# Patient Record
Sex: MALE | Race: WHITE | NOT HISPANIC OR LATINO | Employment: UNEMPLOYED | URBAN - METROPOLITAN AREA
[De-identification: names, ages, dates, MRNs, and addresses within clinical notes are randomized per-mention and may not be internally consistent; named-entity substitution may affect disease eponyms.]

---

## 2019-01-31 ENCOUNTER — TELEPHONE (OUTPATIENT)
Dept: TRANSPLANT | Facility: CLINIC | Age: 24
End: 2019-01-31

## 2019-01-31 NOTE — TELEPHONE ENCOUNTER
----- Message from Yovanny Givens sent at 1/31/2019  1:51 PM CST -----  Contact: [facility]    Have medical recorders that where scanned into media from [facility]. Will call referring MD office if we need any additional information on the pt.    By: Yovanny Givens  ----- Message -----  From: Betzaida Monae RN  Sent: 1/31/2019   1:31 PM  To: Prachi Delacruz, [facility] Liver Referral Pool    Yes, I will forward to [facility] Liver Referral. Please send all liver and hepatology referrals to   [facility] Liver Referral    ----- Message -----  From: Prachi Delacruz  Sent: 1/31/2019   9:25 AM  To: [facility] Clinical Support    Hi Liver Trans    Dr Marty Wray is referring this pt to Liver Trans Dr Mccloud for dx NOVA    The refrl, records are in media mgr.     Can you plz review and assist with appt?     Thanks  [facility]   [id number]

## 2019-02-08 ENCOUNTER — DOCUMENTATION ONLY (OUTPATIENT)
Dept: TRANSPLANT | Facility: CLINIC | Age: 24
End: 2019-02-08

## 2019-02-08 NOTE — LETTER
February 8, 2019    Marty Wray MD  [address]  [address] [place] [address]      Dear Dr. Wray    Patient: Reji Roldan   MR Number: [id number]   YOB: 1995     Thank you for the referral of Reji Roldan to the [facility] program. An initial appointment will be scheduled for your patient with one of our Hepatologists.      Thank you again for your trust in our program.  If there is anything we can do for you or your staff, please feel free to contact us.        Sincerely,        [facility]  [address] [place] [id number]  [phone]

## 2019-02-08 NOTE — NURSING
Pt records reviewed.  Pt will be referred to Hepatology due to NOVA  Initial referral received  from Dr. TANIYA LUU  Referral letter sent to provider and patient.

## 2019-02-08 NOTE — LETTER
February 8, 2019    Reji Roldan  [address] [place] [address]      Dear Reji Roldan:    Your doctor has referred you to the [facility]. We are sending this letter to remind you to make an appointment with us to complete the referral process.     Please call us at [phone] to schedule an appointment. We look forward to seeing you soon.     If you received a call and have been scheduled, please disregard this letter.       Sincerely,        [facility]   [address] [place] [id number]  [phone]

## 2019-04-16 ENCOUNTER — OFFICE VISIT (OUTPATIENT)
Dept: HEPATOLOGY | Facility: CLINIC | Age: 24
End: 2019-04-16
Payer: COMMERCIAL

## 2019-04-16 VITALS
SYSTOLIC BLOOD PRESSURE: 132 MMHG | OXYGEN SATURATION: 99 % | HEIGHT: 70 IN | WEIGHT: 203.69 LBS | DIASTOLIC BLOOD PRESSURE: 81 MMHG | HEART RATE: 76 BPM | TEMPERATURE: 98 F | RESPIRATION RATE: 16 BRPM | BODY MASS INDEX: 29.16 KG/M2

## 2019-04-16 DIAGNOSIS — K76.0 FATTY LIVER: Primary | ICD-10-CM

## 2019-04-16 PROCEDURE — 99244 PR OFFICE CONSULTATION,LEVEL IV: ICD-10-PCS | Mod: S$GLB,,, | Performed by: INTERNAL MEDICINE

## 2019-04-16 PROCEDURE — 99999 PR PBB SHADOW E&M-EST. PATIENT-LVL III: ICD-10-PCS | Mod: PBBFAC,,, | Performed by: INTERNAL MEDICINE

## 2019-04-16 PROCEDURE — 99244 OFF/OP CNSLTJ NEW/EST MOD 40: CPT | Mod: S$GLB,,, | Performed by: INTERNAL MEDICINE

## 2019-04-16 PROCEDURE — 99999 PR PBB SHADOW E&M-EST. PATIENT-LVL III: CPT | Mod: PBBFAC,,, | Performed by: INTERNAL MEDICINE

## 2019-04-16 RX ORDER — LISINOPRIL 40 MG/1
20 TABLET ORAL DAILY
Refills: 0 | COMMUNITY
Start: 2019-04-02

## 2019-04-16 RX ORDER — LISDEXAMFETAMINE DIMESYLATE 60 MG/1
60 CAPSULE ORAL DAILY
Refills: 0 | COMMUNITY
Start: 2019-04-08

## 2019-04-16 NOTE — PATIENT INSTRUCTIONS
Recommendations:  -  Labs : CMP every 3 months, will decide at 3-6 months if need to order BONITA, IgG, A1AT, ceruloplasmin, and possibly a liver biopsy.    -  Low carb diet, avoid rice, potatoes, corn, flour-based foods, sweets, alcohol.    -  Fibroscan to be repeated in 6 months at Dr Wray's office.   -  Return PRN.

## 2019-04-16 NOTE — LETTER
April 16, 2019      Marty Wray MD  [address] [facility] [facility]  [address] [place] [id number]           [facility]at[facility]  [address]  [place] [address]  Phone: [phone]  Fax: [phone]          Patient: Reji Roldan   MR Number: [id number]   YOB: 1995   Date of Visit: 4/16/2019       Dear Dr. Marty Wray:    Thank you for referring Reji Roldan to me for evaluation. Attached you will find relevant portions of my assessment and plan of care.    If you have questions, please do not hesitate to call me. I look forward to following Reji Roldan along with you.    Sincerely,    Theresa Mccloud MD    Enclosure  CC:  No Recipients    If you would like to receive this communication electronically, please contact [email or web address] or [phone] to request more information on [employer] Link access.    For providers and/or their staff who would like to refer a patient to [facility], please contact us through our one-stop-shop provider referral line, [facility], at [phone].    If you feel you have received this communication in error or would no longer like to receive these types of communications, please e-mail [email or web address]

## 2019-04-16 NOTE — PROGRESS NOTES
[facility] Consultation Note    Reason for Consult:  The encounter diagnosis was Fatty liver.    PCP: Primary Doctor No   [address] / [id number]    HPI:  This is a 23 y.o. male here for evaluation of: fatty liver    22 y/o male with fibroscan on 12/27/18 showing fatty liver with , kPa 8.2 which is consistent with F1- F2.  A year earlier on 12/29/17, Fibroscan showed kPa 5.6, .  Patient has a h/o drinking alcohol based on referring MD note.  When questioned by me, patient states mostly social drinking over the weekends up to 4-6 drinks.  Job involves travel, eating out, unable to control type of food available.     Labs sent from Dr. Wray's office:  No CBC, CMP,  PT INR available.  BONITA positive 1:160, speckled pattern.  AMA neg, ASMA negative, Hep A, B, C serologies neg.     Iron sat 18%,   BMI 29.81 12/27/18.   No imaging sent with records.      Elevated liver enzymes: Not available  Abnormal imaging: Not available  Cirrhosis: No  Hepatitis C: No  Hepatitis B: No  Fatty liver: Yes  Encephalopathy: No  Post-hospital discharge: No  Symptoms: none      Risk factors for liver disease:  No jaundice  No transfusions  No IVDU  Did not snort cocaine or similar agents  Did not live with anyone with hepatitis B or C  Sexual partner not tested  No hepatotoxic medications  No exposure to industrial toxins  Alcohol: 2-3 drinks during week, 4-6 drinks during weekends      ROS:  Constitutional: No fevers, chills, weight changes, fatigue  ENT: No allergies, nosebleeds,   CV: No chest pain  Pulm: No cough, shortness of breath  Ophtho: No vision changes  GI/Liver: see HPI  Derm: No rash, itching  Heme: No swollen glands, bruising  MSK: No joint pains, joint swelling  : No dysuria, hematuria, decrease in urine output  Endo: No hot or cold intolerance  Neuro: No confusion, disorientation, difficulty with sleep, memory, concentration, syncope, seizure  Psych: No anxiety,  "depression    Medical History:  has a past medical history of GERD (gastroesophageal reflux disease), Hypertension, and Liver disease.    Surgical History:  has no past surgical history on file.    Family History: family history is not on file..     Social History:  reports that he has never smoked. He does not have any smokeless tobacco history on file. He reports that he drinks alcohol.    Review of patient's allergies indicates:  No Known Allergies    Current Outpatient Medications   Medication Sig    lisinopril (PRINIVIL,ZESTRIL) 40 MG tablet Take 40 mg by mouth once daily.    VYVANSE 60 mg capsule Take 60 mg by mouth once daily.     No current facility-administered medications for this visit.        Objective Findings:    Vital Signs:  /81 (BP Location: Left arm, Patient Position: Sitting, BP Method: Medium (Automatic))   Pulse 76   Temp 98.3 °F (36.8 °C) (Oral)   Resp 16   Ht 5' 10" (1.778 m)   Wt 92.4 kg (203 lb 11.3 oz)   SpO2 99%   BMI 29.23 kg/m²   Body mass index is 29.23 kg/m².    Physical Exam:  General Appearance: Well appearing in no acute distress  Head:   Normocephalic, without obvious abnormality  Eyes:    No scleral icterus, EOMI  ENT: Neck supple, Lips, mucosa, and tongue normal; teeth and gums normal  Lungs: CTA bilaterally in anterior and posterior fields, no wheezes, no crackles.  Heart:  Regular rate and rhythm, S1, S2 normal, no murmurs heard  Abdomen: Soft, non tender, non distended with positive bowel sounds in all four quadrants. Enlarged palpable left lobe of the liver.  Palpable tip of the spleen LUQ.  No ascites, or mass  Extremities: 2+ pulses, no clubbing, cyanosis or edema  Skin: No rash. No spider angiomas.   Neurologic: CN II-XII intact, alert, oriented x 3. No asterixis      Labs:  No results found for: WBC, HGB, HCT, PLT, CHOL, TRIG, HDL, LDLDIRECT, INR, CREATININE, BUN, BILITOT, ALT, AST, ALKPHOS, NA, K, CL, CO2, TSH, PSA, GLUF, HGBA1C, MICROALBUR, " AFP    Imaging:   fibroscans above from 12/2017 and 12/2018    Endoscopy:      Assessment:  1. Fatty liver      Fatty liver with Elastography showing kPa 8.2 (F1-F2) and prior kPa 5.6 (F0) a year previously. Both times CAP over 300, >S3. Transaminases not available from Dr. Wray's office, have called their office to fax cMP, CBC, Plt counts, but when discussed with Dr. Wray, he recalls mild transaminase elevations.  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  Positive BONITA could indicate autoimmune hepatitis. But often, NAFLD can be associated with positive BONITA without plasma cell rich infiltrate on liver biopsy.  Once diet is controlled and patient starts a regular exercise program, if enzymes remain elevated, I plan on getting a liver biopsy.            Recommendations:  -  Labs : CMP every 3 months, will decide at 3-6 months if need to order BONITA, IgG, A1AT, ceruloplasmin, and possibly a liver biopsy.    -  Low carb diet, avoid rice, potatoes, corn, flour-based foods, sweets, alcohol.    -  Fibroscan to be repeated in 6 months at Dr Wray's office.   -  Return PRN.       Follow up if symptoms worsen or fail to improve.      Order summary:  Orders Placed This Encounter   Procedures    Comprehensive metabolic panel       Thank you so much for allowing me to participate in the care of Reji Mccloud MD

## 2019-04-16 NOTE — Clinical Note
Recommendations:-  Labs : CMP every 3 months, will decide at 3-6 months if need to order BONITA, IgG, A1AT, ceruloplasmin   -  Low carb diet, avoid rice, potatoes, corn, flour-based foods, sweets, alcohol.  -  Fibroscan to be repeated in 6 months at Dr Wray's office. -  Return PRN-